# Patient Record
Sex: MALE | Race: WHITE | Employment: OTHER | URBAN - METROPOLITAN AREA
[De-identification: names, ages, dates, MRNs, and addresses within clinical notes are randomized per-mention and may not be internally consistent; named-entity substitution may affect disease eponyms.]

---

## 2023-09-03 PROBLEM — I10 ESSENTIAL HYPERTENSION: Status: ACTIVE | Noted: 2023-09-03

## 2023-09-03 PROBLEM — R30.0 DYSURIA: Status: ACTIVE | Noted: 2023-09-03

## 2023-09-03 PROBLEM — K21.9 GASTROESOPHAGEAL REFLUX DISEASE: Status: ACTIVE | Noted: 2023-09-03

## 2023-09-03 PROBLEM — J44.9 CHRONIC OBSTRUCTIVE PULMONARY DISEASE (MULTI): Status: ACTIVE | Noted: 2023-09-03

## 2023-09-03 PROBLEM — N34.2 URETHRITIS: Status: ACTIVE | Noted: 2023-09-03

## 2023-09-03 PROBLEM — Z91.89 AT RISK FOR BLEEDING: Status: ACTIVE | Noted: 2023-09-03

## 2023-09-03 PROBLEM — E03.9 HYPOTHYROIDISM: Status: ACTIVE | Noted: 2023-09-03

## 2023-09-03 PROBLEM — E55.9 VITAMIN D DEFICIENCY: Status: ACTIVE | Noted: 2023-09-03

## 2023-09-03 PROBLEM — R55 SYNCOPE: Status: ACTIVE | Noted: 2023-09-03

## 2023-09-03 PROBLEM — R51.9 HEADACHE: Status: ACTIVE | Noted: 2023-09-03

## 2023-09-03 PROBLEM — K57.32 DIVERTICULITIS OF COLON: Status: ACTIVE | Noted: 2023-09-03

## 2023-09-03 PROBLEM — Z98.2 VENTRICULAR SHUNT IN PLACE: Status: ACTIVE | Noted: 2023-09-03

## 2023-09-03 PROBLEM — J43.9 PULMONARY EMPHYSEMA (MULTI): Status: ACTIVE | Noted: 2023-09-03

## 2023-09-03 PROBLEM — R41.82 ALTERED MENTAL STATUS: Status: ACTIVE | Noted: 2023-09-03

## 2023-09-03 PROBLEM — E78.00 PURE HYPERCHOLESTEROLEMIA: Status: ACTIVE | Noted: 2023-09-03

## 2023-09-03 PROBLEM — R06.00 DYSPNEA: Status: ACTIVE | Noted: 2023-09-03

## 2023-09-03 PROBLEM — H25.12 AGE-RELATED NUCLEAR CATARACT OF LEFT EYE: Status: ACTIVE | Noted: 2023-09-03

## 2023-09-03 RX ORDER — FLUTICASONE PROPIONATE AND SALMETEROL 250; 50 UG/1; UG/1
1 POWDER RESPIRATORY (INHALATION)
COMMUNITY
End: 2023-10-17

## 2023-09-03 RX ORDER — CIPROFLOXACIN 500 MG/1
500 TABLET ORAL 2 TIMES DAILY
COMMUNITY
Start: 2019-08-28 | End: 2023-09-08 | Stop reason: ALTCHOICE

## 2023-09-03 RX ORDER — ALBUTEROL SULFATE 0.83 MG/ML
SOLUTION RESPIRATORY (INHALATION)
COMMUNITY
Start: 2021-10-12 | End: 2024-01-10 | Stop reason: ALTCHOICE

## 2023-09-03 RX ORDER — LISINOPRIL AND HYDROCHLOROTHIAZIDE 12.5; 2 MG/1; MG/1
1 TABLET ORAL DAILY
COMMUNITY
Start: 2023-01-16 | End: 2023-12-20

## 2023-09-03 RX ORDER — CHOLECALCIFEROL (VITAMIN D3) 125 MCG
125 CAPSULE ORAL DAILY
COMMUNITY
Start: 2023-02-26 | End: 2023-09-08 | Stop reason: DRUGHIGH

## 2023-09-03 RX ORDER — ERGOCALCIFEROL 1.25 MG/1
50000 CAPSULE ORAL
COMMUNITY
Start: 2023-02-26 | End: 2024-01-10 | Stop reason: ALTCHOICE

## 2023-09-03 RX ORDER — ALBUTEROL SULFATE 90 UG/1
2 AEROSOL, METERED RESPIRATORY (INHALATION)
COMMUNITY
Start: 2019-12-02 | End: 2024-04-10 | Stop reason: SDUPTHER

## 2023-09-03 RX ORDER — ACETAMINOPHEN 500 MG
5 TABLET ORAL EVERY EVENING
COMMUNITY

## 2023-09-03 RX ORDER — ONDANSETRON 4 MG/1
1 TABLET, FILM COATED ORAL EVERY 6 HOURS PRN
COMMUNITY
End: 2023-09-08 | Stop reason: ALTCHOICE

## 2023-10-14 DIAGNOSIS — J43.9 PULMONARY EMPHYSEMA, UNSPECIFIED EMPHYSEMA TYPE (MULTI): Primary | ICD-10-CM

## 2023-10-17 DIAGNOSIS — J43.9 PULMONARY EMPHYSEMA, UNSPECIFIED EMPHYSEMA TYPE (MULTI): ICD-10-CM

## 2023-10-17 RX ORDER — FLUTICASONE PROPIONATE AND SALMETEROL 250; 50 UG/1; UG/1
1 POWDER RESPIRATORY (INHALATION) 2 TIMES DAILY
Qty: 60 EACH | Refills: 5 | Status: SHIPPED | OUTPATIENT
Start: 2023-10-17 | End: 2023-10-18 | Stop reason: SDUPTHER

## 2023-10-17 RX ORDER — FLUTICASONE PROPIONATE AND SALMETEROL 250; 50 UG/1; UG/1
1 POWDER RESPIRATORY (INHALATION) 2 TIMES DAILY
Qty: 90 EACH | Refills: 0 | OUTPATIENT
Start: 2023-10-17 | End: 2024-01-15

## 2023-10-18 RX ORDER — FLUTICASONE PROPIONATE AND SALMETEROL 250; 50 UG/1; UG/1
1 POWDER RESPIRATORY (INHALATION) 2 TIMES DAILY
Qty: 3 EACH | Refills: 1 | Status: SHIPPED | OUTPATIENT
Start: 2023-10-18

## 2023-12-18 ENCOUNTER — APPOINTMENT (OUTPATIENT)
Dept: PRIMARY CARE | Facility: CLINIC | Age: 58
End: 2023-12-18
Payer: COMMERCIAL

## 2023-12-18 DIAGNOSIS — I10 PRIMARY HYPERTENSION: Primary | ICD-10-CM

## 2023-12-18 NOTE — PROGRESS NOTES
-additionally diagnosed as MCTD  -Recently seen by rheumatology 6/2023  -Advised starting leflunomide   -Hx of Humira, methotrexate in the past, defers, defers steroid as well  -Taking Meloxicam daily--dicussed side effects  -Continue following physical therapy   [facility]: [facility]  PROGRESS NOTE      Jun Dobson is a 58 y.o. male that is presenting today for No chief complaint on file..    Assessment/Plan   {Assess/Plan SmartLinks (Optional):63150}  Subjective   - Patient is here today for FUV    - Patient denies any XX symptoms or concerns at this time.    - patient denies any XX  adverse reactions to or concerns with his/her meds.      Review of Systems   All pertinent POSITIVES as noted per HPI.  All other systems have been reviewed and are NEGATIVE and /or Noncontributory to this patient current visit or complaint.  Objective   There were no vitals filed for this visit.   There is no height or weight on file to calculate BMI.  Physical Exam  Vitals and nursing note reviewed.   Constitutional:       Appearance: Normal appearance.   HENT:      Head: Normocephalic and atraumatic.   Neck:      Vascular: No carotid bruit.   Cardiovascular:      Rate and Rhythm: Normal rate and regular rhythm.      Pulses: Normal pulses.      Heart sounds: Normal heart sounds.   Pulmonary:      Effort: Pulmonary effort is normal.      Breath sounds: Normal breath sounds.   Abdominal:      General: Abdomen is flat. Bowel sounds are normal.      Palpations: Abdomen is soft.   Musculoskeletal:         General: No swelling. Normal range of motion.      Cervical back: Neck supple.   Lymphadenopathy:      Cervical: No cervical adenopathy.   Skin:     General: Skin is warm and dry.   Neurological:      Mental Status: He is alert.   Psychiatric:         Mood and Affect: Mood normal.     Diagnostic Results   Lab Results   Component Value Date    GLUCOSE 84 02/15/2023    CALCIUM 9.7 02/15/2023     02/15/2023    K 4.7 02/15/2023    CO2 24 02/15/2023     02/15/2023    BUN 18 02/15/2023    CREATININE 1.3 02/15/2023     Lab Results   Component Value Date    ALT 12 02/15/2023    AST 17 02/15/2023    ALKPHOS 51 02/15/2023    BILITOT 0.6 02/15/2023     Lab Results  "  Component Value Date    WBC 8.6 02/15/2023    HGB 14.5 02/15/2023    HCT 41.3 02/15/2023    MCV 91.4 02/15/2023     02/15/2023     Lab Results   Component Value Date    CHOL 141 02/15/2023    CHOL 150 08/13/2021    CHOL 132 (L) 09/01/2018     Lab Results   Component Value Date    HDL 52 02/15/2023    HDL 48 08/13/2021    HDL 37 (L) 09/01/2018     Lab Results   Component Value Date    LDLCALC 80 02/15/2023    LDLCALC 86 08/13/2021    LDLCALC 85 09/01/2018     Lab Results   Component Value Date    TRIG 46 02/15/2023    TRIG 81 08/13/2021    TRIG 49 09/01/2018     No components found for: \"CHOLHDL\"  Lab Results   Component Value Date    HGBA1C 4.7 02/15/2023     Other labs not included in the list above were reviewed either before or during this encounter.    History    Past Medical History:   Diagnosis Date    Personal history of other diseases of the digestive system     History of diverticulitis of colon     Past Surgical History:   Procedure Laterality Date    EYE SURGERY  05/22/2015    Eye Surgery    NECK SURGERY  05/22/2015    Neck Surgery    NOSE SURGERY  05/22/2015    Nose Surgery    OTHER SURGICAL HISTORY  05/22/2015    Brain Surgery     Family History   Problem Relation Name Age of Onset    Heart disease Father      Heart failure Father      Asthma Daughter      Other (Heart Surgery) Daughter       Social History     Socioeconomic History    Marital status:      Spouse name: Not on file    Number of children: Not on file    Years of education: Not on file    Highest education level: Not on file   Occupational History    Not on file   Tobacco Use    Smoking status: Not on file    Smokeless tobacco: Not on file   Substance and Sexual Activity    Alcohol use: Not on file    Drug use: Not on file    Sexual activity: Not on file   Other Topics Concern    Not on file   Social History Narrative    Not on file     Social Determinants of Health     Financial Resource Strain: Not on file   Food " Insecurity: Not on file   Transportation Needs: Not on file   Physical Activity: Not on file   Stress: Not on file   Social Connections: Not on file   Intimate Partner Violence: Not on file   Housing Stability: Not on file     Allergies   Allergen Reactions    Azithromycin Other and Unknown     gi symptoms    Codeine Other and Unknown     gi symptoms    Erythromycin Unknown    Penicillins Hives and Unknown     Breathing issues     Current Outpatient Medications on File Prior to Visit   Medication Sig Dispense Refill    albuterol 2.5 mg /3 mL (0.083 %) nebulizer solution as directed inhalation every 4-6 hrs prn.      albuterol 90 mcg/actuation inhaler Inhale 2 puffs 4 times a day.      ergocalciferol (Vitamin D-2) 1.25 MG (50204 UT) capsule Take 1 capsule (50,000 Units) by mouth. three times a week for 90 days      fluticasone propion-salmeteroL (Wixela Inhub) 250-50 mcg/dose diskus inhaler Inhale 1 puff 2 times a day. 3 each 1    lisinopriL-hydrochlorothiazide 20-12.5 mg tablet Take 1 tablet by mouth once daily.      melatonin 5 mg tablet Take 1 tablet (5 mg) by mouth once daily in the evening.       No current facility-administered medications on file prior to visit.     Immunization History   Administered Date(s) Administered    Pfizer Purple Cap SARS-CoV-2 04/21/2021, 05/12/2021, 12/09/2021    Pneumococcal polysaccharide vaccine, 23-valent, age 2 years and older (PNEUMOVAX 23) 05/22/2015    Tdap vaccine, age 7 year and older (BOOSTRIX) 08/13/2021     Patient's medical history was reviewed and updated either before or during this encounter.       Anand Davila MD

## 2023-12-20 RX ORDER — LISINOPRIL AND HYDROCHLOROTHIAZIDE 12.5; 2 MG/1; MG/1
1 TABLET ORAL DAILY
Qty: 90 TABLET | Refills: 0 | Status: SHIPPED | OUTPATIENT
Start: 2023-12-20 | End: 2024-03-05

## 2024-01-10 ENCOUNTER — OFFICE VISIT (OUTPATIENT)
Dept: PRIMARY CARE | Facility: CLINIC | Age: 59
End: 2024-01-10
Payer: COMMERCIAL

## 2024-01-10 VITALS
OXYGEN SATURATION: 95 % | DIASTOLIC BLOOD PRESSURE: 82 MMHG | WEIGHT: 140 LBS | HEART RATE: 63 BPM | BODY MASS INDEX: 21.22 KG/M2 | HEIGHT: 68 IN | SYSTOLIC BLOOD PRESSURE: 134 MMHG | TEMPERATURE: 97.6 F

## 2024-01-10 DIAGNOSIS — Z86.2 HISTORY OF ANEMIA: ICD-10-CM

## 2024-01-10 DIAGNOSIS — E55.9 VITAMIN D DEFICIENCY: ICD-10-CM

## 2024-01-10 DIAGNOSIS — E78.00 PURE HYPERCHOLESTEROLEMIA: ICD-10-CM

## 2024-01-10 DIAGNOSIS — Z01.89 ENCOUNTER FOR ROUTINE LABORATORY TESTING: ICD-10-CM

## 2024-01-10 DIAGNOSIS — J44.9 CHRONIC OBSTRUCTIVE PULMONARY DISEASE, UNSPECIFIED COPD TYPE (MULTI): ICD-10-CM

## 2024-01-10 DIAGNOSIS — Z12.5 ENCOUNTER FOR PROSTATE CANCER SCREENING: ICD-10-CM

## 2024-01-10 DIAGNOSIS — I10 ESSENTIAL HYPERTENSION: Primary | ICD-10-CM

## 2024-01-10 PROCEDURE — 1036F TOBACCO NON-USER: CPT | Performed by: INTERNAL MEDICINE

## 2024-01-10 PROCEDURE — 99213 OFFICE O/P EST LOW 20 MIN: CPT | Performed by: INTERNAL MEDICINE

## 2024-01-10 PROCEDURE — 3075F SYST BP GE 130 - 139MM HG: CPT | Performed by: INTERNAL MEDICINE

## 2024-01-10 PROCEDURE — 3079F DIAST BP 80-89 MM HG: CPT | Performed by: INTERNAL MEDICINE

## 2024-01-10 RX ORDER — UMECLIDINIUM 62.5 UG/1
AEROSOL, POWDER ORAL
COMMUNITY
Start: 2024-01-08 | End: 2024-01-10

## 2024-01-10 RX ORDER — ACETAMINOPHEN 500 MG
TABLET ORAL DAILY
COMMUNITY
End: 2024-04-10 | Stop reason: ALTCHOICE

## 2024-01-10 ASSESSMENT — PATIENT HEALTH QUESTIONNAIRE - PHQ9
1. LITTLE INTEREST OR PLEASURE IN DOING THINGS: NOT AT ALL
2. FEELING DOWN, DEPRESSED OR HOPELESS: NOT AT ALL
SUM OF ALL RESPONSES TO PHQ9 QUESTIONS 1 AND 2: 0

## 2024-01-10 ASSESSMENT — ENCOUNTER SYMPTOMS
LOSS OF SENSATION IN FEET: 0
OCCASIONAL FEELINGS OF UNSTEADINESS: 0
DEPRESSION: 0

## 2024-01-10 ASSESSMENT — PAIN SCALES - GENERAL: PAINLEVEL: 0-NO PAIN

## 2024-03-05 DIAGNOSIS — I10 PRIMARY HYPERTENSION: ICD-10-CM

## 2024-03-05 RX ORDER — LISINOPRIL AND HYDROCHLOROTHIAZIDE 12.5; 2 MG/1; MG/1
1 TABLET ORAL DAILY
Qty: 90 TABLET | Refills: 0 | Status: SHIPPED | OUTPATIENT
Start: 2024-03-05 | End: 2024-04-11 | Stop reason: ALTCHOICE

## 2024-04-03 ENCOUNTER — LAB (OUTPATIENT)
Dept: LAB | Facility: LAB | Age: 59
End: 2024-04-03
Payer: COMMERCIAL

## 2024-04-03 DIAGNOSIS — I10 ESSENTIAL HYPERTENSION: ICD-10-CM

## 2024-04-03 DIAGNOSIS — E78.00 PURE HYPERCHOLESTEROLEMIA: ICD-10-CM

## 2024-04-03 DIAGNOSIS — Z12.5 ENCOUNTER FOR PROSTATE CANCER SCREENING: ICD-10-CM

## 2024-04-03 DIAGNOSIS — Z86.2 HISTORY OF ANEMIA: ICD-10-CM

## 2024-04-03 DIAGNOSIS — Z01.89 ENCOUNTER FOR ROUTINE LABORATORY TESTING: ICD-10-CM

## 2024-04-03 DIAGNOSIS — E55.9 VITAMIN D DEFICIENCY: ICD-10-CM

## 2024-04-03 LAB
25(OH)D3 SERPL-MCNC: 130 NG/ML (ref 31–100)
ALBUMIN SERPL-MCNC: 4.7 G/DL (ref 3.5–5)
ALP BLD-CCNC: 51 U/L (ref 35–125)
ALT SERPL-CCNC: 16 U/L (ref 5–40)
ANION GAP SERPL CALC-SCNC: 13 MMOL/L
AST SERPL-CCNC: 16 U/L (ref 5–40)
BASOPHILS # BLD AUTO: 0.07 X10*3/UL (ref 0–0.1)
BASOPHILS NFR BLD AUTO: 0.7 %
BILIRUB SERPL-MCNC: 0.4 MG/DL (ref 0.1–1.2)
BUN SERPL-MCNC: 15 MG/DL (ref 8–25)
CALCIUM SERPL-MCNC: 9.9 MG/DL (ref 8.5–10.4)
CHLORIDE SERPL-SCNC: 102 MMOL/L (ref 97–107)
CHOLEST SERPL-MCNC: 183 MG/DL (ref 133–200)
CHOLEST/HDLC SERPL: 3 {RATIO}
CO2 SERPL-SCNC: 25 MMOL/L (ref 24–31)
CREAT SERPL-MCNC: 1.4 MG/DL (ref 0.4–1.6)
EGFRCR SERPLBLD CKD-EPI 2021: 57 ML/MIN/1.73M*2
EOSINOPHIL # BLD AUTO: 0.2 X10*3/UL (ref 0–0.7)
EOSINOPHIL NFR BLD AUTO: 2 %
ERYTHROCYTE [DISTWIDTH] IN BLOOD BY AUTOMATED COUNT: 13.1 % (ref 11.5–14.5)
EST. AVERAGE GLUCOSE BLD GHB EST-MCNC: 94 MG/DL
GLUCOSE SERPL-MCNC: 88 MG/DL (ref 65–99)
HBA1C MFR BLD: 4.9 %
HCT VFR BLD AUTO: 42.1 % (ref 41–52)
HDLC SERPL-MCNC: 61 MG/DL
HGB BLD-MCNC: 14.1 G/DL (ref 13.5–17.5)
IMM GRANULOCYTES # BLD AUTO: 0.01 X10*3/UL (ref 0–0.7)
IMM GRANULOCYTES NFR BLD AUTO: 0.1 % (ref 0–0.9)
LDLC SERPL CALC-MCNC: 110 MG/DL (ref 65–130)
LYMPHOCYTES # BLD AUTO: 2.04 X10*3/UL (ref 1.2–4.8)
LYMPHOCYTES NFR BLD AUTO: 20.7 %
MCH RBC QN AUTO: 31.8 PG (ref 26–34)
MCHC RBC AUTO-ENTMCNC: 33.5 G/DL (ref 32–36)
MCV RBC AUTO: 95 FL (ref 80–100)
MONOCYTES # BLD AUTO: 0.88 X10*3/UL (ref 0.1–1)
MONOCYTES NFR BLD AUTO: 8.9 %
NEUTROPHILS # BLD AUTO: 6.64 X10*3/UL (ref 1.2–7.7)
NEUTROPHILS NFR BLD AUTO: 67.6 %
NRBC BLD-RTO: 0 /100 WBCS (ref 0–0)
PLATELET # BLD AUTO: 306 X10*3/UL (ref 150–450)
POTASSIUM SERPL-SCNC: 4.5 MMOL/L (ref 3.4–5.1)
PROT SERPL-MCNC: 6.9 G/DL (ref 5.9–7.9)
PSA SERPL-MCNC: 0.9 NG/ML
RBC # BLD AUTO: 4.43 X10*6/UL (ref 4.5–5.9)
SODIUM SERPL-SCNC: 140 MMOL/L (ref 133–145)
TRIGL SERPL-MCNC: 58 MG/DL (ref 40–150)
TSH SERPL DL<=0.05 MIU/L-ACNC: 0.56 MIU/L (ref 0.27–4.2)
WBC # BLD AUTO: 9.8 X10*3/UL (ref 4.4–11.3)

## 2024-04-03 PROCEDURE — 84153 ASSAY OF PSA TOTAL: CPT

## 2024-04-03 PROCEDURE — 83036 HEMOGLOBIN GLYCOSYLATED A1C: CPT

## 2024-04-03 PROCEDURE — 36415 COLL VENOUS BLD VENIPUNCTURE: CPT

## 2024-04-03 PROCEDURE — 80053 COMPREHEN METABOLIC PANEL: CPT

## 2024-04-03 PROCEDURE — 84443 ASSAY THYROID STIM HORMONE: CPT

## 2024-04-03 PROCEDURE — 82306 VITAMIN D 25 HYDROXY: CPT

## 2024-04-03 PROCEDURE — 80061 LIPID PANEL: CPT

## 2024-04-03 PROCEDURE — 85025 COMPLETE CBC W/AUTO DIFF WBC: CPT

## 2024-04-05 PROBLEM — R05.9 COUGH, UNSPECIFIED: Status: ACTIVE | Noted: 2024-04-05

## 2024-04-05 PROBLEM — R06.02 SHORTNESS OF BREATH: Status: ACTIVE | Noted: 2023-09-03

## 2024-04-05 PROBLEM — H25.10 NUCLEAR SENILE CATARACT: Status: ACTIVE | Noted: 2023-09-03

## 2024-04-05 PROBLEM — Z86.2 HISTORY OF ANEMIA: Status: ACTIVE | Noted: 2024-04-03

## 2024-04-05 PROBLEM — R10.30 LOWER ABDOMINAL PAIN: Status: ACTIVE | Noted: 2024-04-05

## 2024-04-05 RX ORDER — UMECLIDINIUM 62.5 UG/1
AEROSOL, POWDER ORAL
COMMUNITY
Start: 2024-02-04

## 2024-04-05 RX ORDER — AMLODIPINE BESYLATE 5 MG/1
TABLET ORAL EVERY 24 HOURS
COMMUNITY
Start: 2022-07-12 | End: 2024-04-10

## 2024-04-10 ENCOUNTER — OFFICE VISIT (OUTPATIENT)
Dept: PRIMARY CARE | Facility: CLINIC | Age: 59
End: 2024-04-10
Payer: COMMERCIAL

## 2024-04-10 VITALS
TEMPERATURE: 97.8 F | HEIGHT: 68 IN | SYSTOLIC BLOOD PRESSURE: 122 MMHG | WEIGHT: 141 LBS | HEART RATE: 56 BPM | BODY MASS INDEX: 21.37 KG/M2 | DIASTOLIC BLOOD PRESSURE: 75 MMHG | OXYGEN SATURATION: 96 %

## 2024-04-10 DIAGNOSIS — R25.2 CRAMPING OF HANDS: ICD-10-CM

## 2024-04-10 DIAGNOSIS — N18.31 STAGE 3A CHRONIC KIDNEY DISEASE (MULTI): ICD-10-CM

## 2024-04-10 DIAGNOSIS — I10 PRIMARY HYPERTENSION: Primary | ICD-10-CM

## 2024-04-10 DIAGNOSIS — J43.2 CENTRILOBULAR EMPHYSEMA (MULTI): ICD-10-CM

## 2024-04-10 DIAGNOSIS — E55.9 VITAMIN D DEFICIENCY: ICD-10-CM

## 2024-04-10 PROBLEM — N18.30 STAGE 3 CHRONIC KIDNEY DISEASE (MULTI): Status: ACTIVE | Noted: 2024-04-10

## 2024-04-10 PROCEDURE — 3074F SYST BP LT 130 MM HG: CPT | Performed by: INTERNAL MEDICINE

## 2024-04-10 PROCEDURE — 99214 OFFICE O/P EST MOD 30 MIN: CPT | Performed by: INTERNAL MEDICINE

## 2024-04-10 PROCEDURE — 3078F DIAST BP <80 MM HG: CPT | Performed by: INTERNAL MEDICINE

## 2024-04-10 RX ORDER — ALBUTEROL SULFATE 90 UG/1
2 AEROSOL, METERED RESPIRATORY (INHALATION)
Qty: 18 G | Refills: 3 | Status: SHIPPED | OUTPATIENT
Start: 2024-04-10

## 2024-04-10 RX ORDER — LISINOPRIL 10 MG/1
10 TABLET ORAL DAILY
Qty: 90 TABLET | Refills: 0 | Status: SHIPPED | OUTPATIENT
Start: 2024-04-10

## 2024-04-10 ASSESSMENT — PATIENT HEALTH QUESTIONNAIRE - PHQ9
2. FEELING DOWN, DEPRESSED OR HOPELESS: NOT AT ALL
SUM OF ALL RESPONSES TO PHQ9 QUESTIONS 1 AND 2: 0
1. LITTLE INTEREST OR PLEASURE IN DOING THINGS: NOT AT ALL

## 2024-04-10 ASSESSMENT — PAIN SCALES - GENERAL: PAINLEVEL: 0-NO PAIN

## 2024-04-10 NOTE — PROGRESS NOTES
"[facility]: [facility]  PROGRESS NOTE      Jun Dobson is a 59 y.o. male that is presenting today for Follow-up (3 mo fu FBW, fingers \"freeze\" up several times a week, unable to them).    Assessment/Plan   Diagnoses and all orders for this visit:  Centrilobular emphysema (CMS/HCC)     Under control with current treatment   Continue the same   Rx E-scripted 1 days x 1  -     albuterol 90 mcg/actuation inhaler; Inhale 2 puffs 4 times a day.  Primary hypertension  Under control with current treatment   Continue the same   Rx E-scripted 90 days x 1  -     lisinopril 10 mg tablet; Take 1 tablet (10 mg) by mouth once daily.  -     Renal function panel; Future  Stage 3a chronic kidney disease (CMS/HCC)      - Stable / Continue to monitor    - Following with Nephrology   - Stressed importance of hydration and avoiding Nephrotoxic meds mostly OTC NSAIDs  -     Renal function panel; Future  Cramping of hands       Start Co Q10 200 mg daily  Vitamin D deficiency  -     cholecalciferol (Vitamin D3) 50 mcg (2,000 unit) capsule; Take 1 capsule (50 mcg) by mouth once daily.  Other orders  -     Follow Up In Primary Care  -     Follow Up In Primary Care; Future  Subjective   - Patient is here today for 6 months FUV hand cramps in the evening     - Patient denies any other symptoms or concerns , at this time.    - patient denies any adverse reactions to or concerns with his/her meds.      Review of Systems   All pertinent POSITIVES as noted per HPI.  All other systems have been reviewed and are NEGATIVE and /or Noncontributory to this patient current visit or complaint.    Objective   Vitals:    04/10/24 1409   BP: 122/75   Pulse: 56   Temp: 36.6 °C (97.8 °F)   SpO2: 96%      Body mass index is 21.44 kg/m².  Physical Exam  Vitals and nursing note reviewed.   Constitutional:       Appearance: Normal appearance.   HENT:      Head: Normocephalic and atraumatic.   Neck:      Vascular: No carotid bruit. " "  Cardiovascular:      Rate and Rhythm: Normal rate and regular rhythm.      Pulses: Normal pulses.      Heart sounds: Normal heart sounds.   Pulmonary:      Effort: Pulmonary effort is normal.      Breath sounds: Normal breath sounds.   Abdominal:      General: Abdomen is flat. Bowel sounds are normal.      Palpations: Abdomen is soft.   Musculoskeletal:         General: No swelling. Normal range of motion.      Cervical back: Neck supple.   Lymphadenopathy:      Cervical: No cervical adenopathy.   Skin:     General: Skin is warm and dry.   Neurological:      Mental Status: He is alert.   Psychiatric:         Mood and Affect: Mood normal.       Diagnostic Results   Lab Results   Component Value Date    GLUCOSE 88 04/03/2024    CALCIUM 9.9 04/03/2024     04/03/2024    K 4.5 04/03/2024    CO2 25 04/03/2024     04/03/2024    BUN 15 04/03/2024    CREATININE 1.40 04/03/2024     Lab Results   Component Value Date    ALT 16 04/03/2024    AST 16 04/03/2024    ALKPHOS 51 04/03/2024    BILITOT 0.4 04/03/2024     Lab Results   Component Value Date    WBC 9.8 04/03/2024    HGB 14.1 04/03/2024    HCT 42.1 04/03/2024    MCV 95 04/03/2024     04/03/2024     Lab Results   Component Value Date    CHOL 183 04/03/2024    CHOL 141 02/15/2023    CHOL 150 08/13/2021     Lab Results   Component Value Date    HDL 61.0 04/03/2024    HDL 52 02/15/2023    HDL 48 08/13/2021     Lab Results   Component Value Date    LDLCALC 110 04/03/2024    LDLCALC 80 02/15/2023    LDLCALC 86 08/13/2021     Lab Results   Component Value Date    TRIG 58 04/03/2024    TRIG 46 02/15/2023    TRIG 81 08/13/2021     No components found for: \"CHOLHDL\"  Lab Results   Component Value Date    HGBA1C 4.9 04/03/2024     Other labs not included in the list above were reviewed either before or during this encounter.    History    Past Medical History:   Diagnosis Date    Personal history of other diseases of the digestive system     History of " diverticulitis of colon     Past Surgical History:   Procedure Laterality Date    EYE SURGERY  2015    Eye Surgery    NECK SURGERY  2015    Neck Surgery    NOSE SURGERY  2015    Nose Surgery    OTHER SURGICAL HISTORY  2015    Brain Surgery     Family History   Problem Relation Name Age of Onset    Heart disease Father      Heart failure Father      Asthma Daughter      Other (Heart Surgery) Daughter       Social History     Socioeconomic History    Marital status:      Spouse name: Not on file    Number of children: Not on file    Years of education: Not on file    Highest education level: Not on file   Occupational History    Not on file   Tobacco Use    Smoking status: Former     Current packs/day: 0.00     Types: Cigarettes     Quit date:      Years since quittin.2     Passive exposure: Past    Smokeless tobacco: Never   Vaping Use    Vaping status: Never Used   Substance and Sexual Activity    Alcohol use: Yes    Drug use: Never    Sexual activity: Not on file   Other Topics Concern    Not on file   Social History Narrative    Not on file     Social Determinants of Health     Financial Resource Strain: Not on file   Food Insecurity: Not on file   Transportation Needs: Not on file   Physical Activity: Not on file   Stress: Not on file   Social Connections: Not on file   Intimate Partner Violence: Not on file   Housing Stability: Not on file     Allergies   Allergen Reactions    Azithromycin Other and Unknown     gi symptoms    Codeine Other and Unknown     gi symptoms    Erythromycin Unknown    Penicillins Hives and Unknown     Breathing issues     Current Outpatient Medications on File Prior to Visit   Medication Sig Dispense Refill    albuterol 90 mcg/actuation inhaler Inhale 2 puffs 4 times a day.      amLODIPine (Norvasc) 5 mg tablet Take by mouth once every 24 hours.      cholecalciferol (Vitamin D3) 50 mcg (2,000 unit) capsule Take by mouth once daily.       fluticasone propion-salmeteroL (Wixela Inhub) 250-50 mcg/dose diskus inhaler Inhale 1 puff 2 times a day. 3 each 1    Incruse Ellipta 62.5 mcg/actuation inhalation       lisinopriL-hydrochlorothiazide 20-12.5 mg tablet TAKE 1 TABLET BY MOUTH EVERY DAY 90 tablet 0    melatonin 5 mg tablet Take 1 tablet (5 mg) by mouth once daily in the evening.       No current facility-administered medications on file prior to visit.     Immunization History   Administered Date(s) Administered    Pfizer Purple Cap SARS-CoV-2 04/21/2021, 05/12/2021, 12/09/2021    Pneumococcal polysaccharide vaccine, 23-valent, age 2 years and older (PNEUMOVAX 23) 05/22/2015    Tdap vaccine, age 7 year and older (BOOSTRIX, ADACEL) 08/13/2021     Patient's medical history was reviewed and updated either before or during this encounter.       Anand Davila MD

## 2024-04-11 PROBLEM — R25.2 CRAMPING OF HANDS: Status: ACTIVE | Noted: 2024-04-11

## 2024-04-11 RX ORDER — ACETAMINOPHEN 160 MG/5ML
200 SUSPENSION, ORAL (FINAL DOSE FORM) ORAL DAILY
Qty: 30 CAPSULE | Refills: 11 | Status: SHIPPED | OUTPATIENT
Start: 2024-04-11 | End: 2025-04-11

## 2024-04-11 RX ORDER — ACETAMINOPHEN 500 MG
2000 TABLET ORAL DAILY
Start: 2024-04-11

## 2024-06-17 ENCOUNTER — LAB (OUTPATIENT)
Dept: LAB | Facility: LAB | Age: 59
End: 2024-06-17
Payer: COMMERCIAL

## 2024-06-17 DIAGNOSIS — N18.31 STAGE 3A CHRONIC KIDNEY DISEASE (MULTI): ICD-10-CM

## 2024-06-17 DIAGNOSIS — I10 PRIMARY HYPERTENSION: ICD-10-CM

## 2024-06-17 LAB
ALBUMIN SERPL-MCNC: 4.3 G/DL (ref 3.5–5)
ANION GAP SERPL CALC-SCNC: 9 MMOL/L
BUN SERPL-MCNC: 7 MG/DL (ref 8–25)
CALCIUM SERPL-MCNC: 9.6 MG/DL (ref 8.5–10.4)
CHLORIDE SERPL-SCNC: 107 MMOL/L (ref 97–107)
CO2 SERPL-SCNC: 24 MMOL/L (ref 24–31)
CREAT SERPL-MCNC: 1.2 MG/DL (ref 0.4–1.6)
EGFRCR SERPLBLD CKD-EPI 2021: 69 ML/MIN/1.73M*2
GLUCOSE SERPL-MCNC: 94 MG/DL (ref 65–99)
PHOSPHATE SERPL-MCNC: 2.5 MG/DL (ref 2.5–4.5)
POTASSIUM SERPL-SCNC: 4.4 MMOL/L (ref 3.4–5.1)
SODIUM SERPL-SCNC: 140 MMOL/L (ref 133–145)

## 2024-06-17 PROCEDURE — 80069 RENAL FUNCTION PANEL: CPT

## 2024-06-17 PROCEDURE — 36415 COLL VENOUS BLD VENIPUNCTURE: CPT

## 2024-06-20 ENCOUNTER — OFFICE VISIT (OUTPATIENT)
Dept: PRIMARY CARE | Facility: CLINIC | Age: 59
End: 2024-06-20
Payer: COMMERCIAL

## 2024-06-20 VITALS
OXYGEN SATURATION: 95 % | WEIGHT: 138 LBS | HEART RATE: 73 BPM | SYSTOLIC BLOOD PRESSURE: 162 MMHG | TEMPERATURE: 97 F | HEIGHT: 68 IN | DIASTOLIC BLOOD PRESSURE: 84 MMHG | BODY MASS INDEX: 20.92 KG/M2

## 2024-06-20 DIAGNOSIS — I10 UNCONTROLLED HYPERTENSION: Primary | ICD-10-CM

## 2024-06-20 PROCEDURE — 3079F DIAST BP 80-89 MM HG: CPT | Performed by: INTERNAL MEDICINE

## 2024-06-20 PROCEDURE — 99213 OFFICE O/P EST LOW 20 MIN: CPT | Performed by: INTERNAL MEDICINE

## 2024-06-20 PROCEDURE — 3077F SYST BP >= 140 MM HG: CPT | Performed by: INTERNAL MEDICINE

## 2024-06-20 RX ORDER — TIOTROPIUM BROMIDE 18 UG/1
1 CAPSULE ORAL; RESPIRATORY (INHALATION)
COMMUNITY
Start: 2024-04-29

## 2024-06-20 RX ORDER — LISINOPRIL AND HYDROCHLOROTHIAZIDE 10; 12.5 MG/1; MG/1
1 TABLET ORAL DAILY
Qty: 30 TABLET | Refills: 2 | Status: SHIPPED | OUTPATIENT
Start: 2024-06-20

## 2024-06-20 ASSESSMENT — PAIN SCALES - GENERAL: PAINLEVEL: 0-NO PAIN

## 2024-06-20 NOTE — PROGRESS NOTES
[facility]: [facility]  PROGRESS NOTE      Jun Dobson is a 59 y.o. male that is presenting today for Follow-up.    Assessment/Plan   Diagnoses and all orders for this visit:  Uncontrolled hypertension     Uncontrolled with current treatment   Continue Lisonipril 10 mg and add the HCT  Rx E-scripted 100 days x 1  -  Start   lisinopriL-hydrochlorothiazide 10-12.5 mg tablet; Take 1 tablet by mouth once daily.  -     Renal function panel; Future  Other orders  -     Follow Up In Primary Care  -     Follow Up In Primary Care; Future  Subjective     - Jun Dobson 59 y.o. male is here today for FUV on his elevated BP - been watching his salt        - Patient denies any symptoms or concerns at this time.       - patient denies any adverse reactions to or concerns with his/her meds.       - Problem list and medication reconciliation done today.  - V.S. Stable. No changes at this time.  - Encouraged continued diet and exercise modification.    Review of Systems   All pertinent POSITIVES as noted per HPI.  All other systems have been reviewed and are NEGATIVE and /or Noncontributory to this patient current visit or complaint.    Objective   Vitals:    06/20/24 1330   BP: 162/84   Pulse: 73   Temp: 36.1 °C (97 °F)   SpO2: 95%      Body mass index is 20.98 kg/m².  Physical Exam  Vitals and nursing note reviewed.   Constitutional:       Appearance: Normal appearance.   HENT:      Head: Normocephalic and atraumatic.   Neck:      Vascular: No carotid bruit.   Cardiovascular:      Rate and Rhythm: Normal rate and regular rhythm.      Pulses: Normal pulses.      Heart sounds: Normal heart sounds.   Pulmonary:      Effort: Pulmonary effort is normal.      Breath sounds: Normal breath sounds.   Abdominal:      General: Abdomen is flat. Bowel sounds are normal.      Palpations: Abdomen is soft.   Musculoskeletal:         General: No swelling. Normal range of motion.      Cervical back: Neck supple.  "  Lymphadenopathy:      Cervical: No cervical adenopathy.   Skin:     General: Skin is warm and dry.   Neurological:      Mental Status: He is alert.   Psychiatric:         Mood and Affect: Mood normal.     Diagnostic Results   Lab Results   Component Value Date    GLUCOSE 94 06/17/2024    CALCIUM 9.6 06/17/2024     06/17/2024    K 4.4 06/17/2024    CO2 24 06/17/2024     06/17/2024    BUN 7 (L) 06/17/2024    CREATININE 1.20 06/17/2024     Lab Results   Component Value Date    ALT 16 04/03/2024    AST 16 04/03/2024    ALKPHOS 51 04/03/2024    BILITOT 0.4 04/03/2024     Lab Results   Component Value Date    WBC 9.8 04/03/2024    HGB 14.1 04/03/2024    HCT 42.1 04/03/2024    MCV 95 04/03/2024     04/03/2024     Lab Results   Component Value Date    CHOL 183 04/03/2024    CHOL 141 02/15/2023    CHOL 150 08/13/2021     Lab Results   Component Value Date    HDL 61.0 04/03/2024    HDL 52 02/15/2023    HDL 48 08/13/2021     Lab Results   Component Value Date    LDLCALC 110 04/03/2024    LDLCALC 80 02/15/2023    LDLCALC 86 08/13/2021     Lab Results   Component Value Date    TRIG 58 04/03/2024    TRIG 46 02/15/2023    TRIG 81 08/13/2021     No components found for: \"CHOLHDL\"  Lab Results   Component Value Date    HGBA1C 4.9 04/03/2024     Other labs not included in the list above were reviewed either before or during this encounter.    History    Past Medical History:   Diagnosis Date    Personal history of other diseases of the digestive system     History of diverticulitis of colon     Past Surgical History:   Procedure Laterality Date    EYE SURGERY  05/22/2015    Eye Surgery    NECK SURGERY  05/22/2015    Neck Surgery    NOSE SURGERY  05/22/2015    Nose Surgery    OTHER SURGICAL HISTORY  05/22/2015    Brain Surgery     Family History   Problem Relation Name Age of Onset    Heart disease Father      Heart failure Father      Asthma Daughter      Other (Heart Surgery) Daughter       Social History "     Socioeconomic History    Marital status:      Spouse name: Not on file    Number of children: Not on file    Years of education: Not on file    Highest education level: Not on file   Occupational History    Not on file   Tobacco Use    Smoking status: Former     Current packs/day: 0.00     Types: Cigarettes     Quit date:      Years since quittin.4     Passive exposure: Past    Smokeless tobacco: Never   Vaping Use    Vaping status: Never Used   Substance and Sexual Activity    Alcohol use: Yes    Drug use: Never    Sexual activity: Not on file   Other Topics Concern    Not on file   Social History Narrative    Not on file     Social Determinants of Health     Financial Resource Strain: Not on file   Food Insecurity: Not on file   Transportation Needs: Not on file   Physical Activity: Not on file   Stress: Not on file   Social Connections: Not on file   Intimate Partner Violence: Not on file   Housing Stability: Not on file     Allergies   Allergen Reactions    Azithromycin Other and Unknown     gi symptoms    Codeine Other and Unknown     gi symptoms    Erythromycin Unknown    Penicillins Hives and Unknown     Breathing issues     Current Outpatient Medications on File Prior to Visit   Medication Sig Dispense Refill    albuterol 90 mcg/actuation inhaler Inhale 2 puffs 4 times a day. 18 g 3    cholecalciferol (Vitamin D3) 50 mcg (2,000 unit) capsule Take 1 capsule (50 mcg) by mouth once daily.      coenzyme Q-10 (Co Q-10) 200 mg capsule Take 1 capsule (200 mg) by mouth once daily. 30 capsule 11    fluticasone propion-salmeteroL (Wixela Inhub) 250-50 mcg/dose diskus inhaler Inhale 1 puff 2 times a day. 3 each 1    Incruse Ellipta 62.5 mcg/actuation inhalation       lisinopril 10 mg tablet Take 1 tablet (10 mg) by mouth once daily. 90 tablet 0    melatonin 5 mg tablet Take 1 tablet (5 mg) by mouth once daily in the evening.      tiotropium (Spiriva) 18 mcg inhalation capsule Place 1 capsule (18  mcg) into inhaler and inhale once daily.       No current facility-administered medications on file prior to visit.     Immunization History   Administered Date(s) Administered    Pfizer Purple Cap SARS-CoV-2 04/21/2021, 05/12/2021, 12/09/2021    Pneumococcal polysaccharide vaccine, 23-valent, age 2 years and older (PNEUMOVAX 23) 05/22/2015    Tdap vaccine, age 7 year and older (BOOSTRIX, ADACEL) 08/13/2021     Patient's medical history was reviewed and updated either before or during this encounter.       Anand Davila MD

## 2024-07-11 DIAGNOSIS — J43.9 PULMONARY EMPHYSEMA, UNSPECIFIED EMPHYSEMA TYPE (MULTI): ICD-10-CM

## 2024-07-12 RX ORDER — FLUTICASONE PROPIONATE AND SALMETEROL 250; 50 UG/1; UG/1
1 POWDER RESPIRATORY (INHALATION) 2 TIMES DAILY
Qty: 60 EACH | Refills: 1 | Status: SHIPPED | OUTPATIENT
Start: 2024-07-12

## 2024-08-22 ENCOUNTER — APPOINTMENT (OUTPATIENT)
Dept: PRIMARY CARE | Facility: CLINIC | Age: 59
End: 2024-08-22
Payer: COMMERCIAL

## 2024-09-09 ENCOUNTER — APPOINTMENT (OUTPATIENT)
Dept: PRIMARY CARE | Facility: CLINIC | Age: 59
End: 2024-09-09
Payer: COMMERCIAL

## 2024-09-09 DIAGNOSIS — I10 UNCONTROLLED HYPERTENSION: ICD-10-CM

## 2024-09-09 DIAGNOSIS — J43.9 PULMONARY EMPHYSEMA, UNSPECIFIED EMPHYSEMA TYPE (MULTI): ICD-10-CM

## 2024-09-14 RX ORDER — LISINOPRIL AND HYDROCHLOROTHIAZIDE 10; 12.5 MG/1; MG/1
1 TABLET ORAL DAILY
Qty: 100 TABLET | Refills: 0 | Status: SHIPPED | OUTPATIENT
Start: 2024-09-14

## 2024-09-14 RX ORDER — FLUTICASONE PROPIONATE AND SALMETEROL 250; 50 UG/1; UG/1
1 POWDER RESPIRATORY (INHALATION) 2 TIMES DAILY
Qty: 180 EACH | Refills: 1 | Status: SHIPPED | OUTPATIENT
Start: 2024-09-14

## 2024-09-30 ENCOUNTER — TELEPHONE (OUTPATIENT)
Dept: PRIMARY CARE | Facility: CLINIC | Age: 59
End: 2024-09-30
Payer: COMMERCIAL

## 2024-09-30 DIAGNOSIS — J43.2 CENTRILOBULAR EMPHYSEMA (MULTI): ICD-10-CM

## 2024-09-30 RX ORDER — ALBUTEROL SULFATE 90 UG/1
2 INHALANT RESPIRATORY (INHALATION)
Qty: 18 G | Refills: 3 | Status: SHIPPED | OUTPATIENT
Start: 2024-09-30

## 2024-09-30 NOTE — TELEPHONE ENCOUNTER
Lola urena.  VALENTÍN 6/20/24, [place] 11/5/24    Albuterol sulfate     [address], [place]

## 2024-11-02 ENCOUNTER — LAB (OUTPATIENT)
Dept: LAB | Facility: LAB | Age: 59
End: 2024-11-02
Payer: COMMERCIAL

## 2024-11-02 DIAGNOSIS — I10 UNCONTROLLED HYPERTENSION: ICD-10-CM

## 2024-11-02 LAB
ALBUMIN SERPL BCP-MCNC: 4.4 G/DL (ref 3.4–5)
ANION GAP SERPL CALCULATED.3IONS-SCNC: 9 MMOL/L (ref 10–20)
BUN SERPL-MCNC: 10 MG/DL (ref 6–23)
CALCIUM SERPL-MCNC: 9.5 MG/DL (ref 8.6–10.3)
CHLORIDE SERPL-SCNC: 101 MMOL/L (ref 98–107)
CO2 SERPL-SCNC: 31 MMOL/L (ref 21–32)
CREAT SERPL-MCNC: 1.05 MG/DL (ref 0.5–1.3)
EGFRCR SERPLBLD CKD-EPI 2021: 81 ML/MIN/1.73M*2
GLUCOSE SERPL-MCNC: 109 MG/DL (ref 74–99)
PHOSPHATE SERPL-MCNC: 3.3 MG/DL (ref 2.5–4.9)
POTASSIUM SERPL-SCNC: 5.1 MMOL/L (ref 3.5–5.3)
SODIUM SERPL-SCNC: 136 MMOL/L (ref 136–145)

## 2024-11-02 PROCEDURE — 36415 COLL VENOUS BLD VENIPUNCTURE: CPT

## 2024-11-02 PROCEDURE — 80069 RENAL FUNCTION PANEL: CPT

## 2024-11-05 ENCOUNTER — LAB (OUTPATIENT)
Dept: LAB | Facility: LAB | Age: 59
End: 2024-11-05
Payer: COMMERCIAL

## 2024-11-05 ENCOUNTER — OFFICE VISIT (OUTPATIENT)
Dept: PRIMARY CARE | Facility: CLINIC | Age: 59
End: 2024-11-05
Payer: COMMERCIAL

## 2024-11-05 VITALS
BODY MASS INDEX: 19.4 KG/M2 | HEART RATE: 71 BPM | HEIGHT: 68 IN | SYSTOLIC BLOOD PRESSURE: 133 MMHG | TEMPERATURE: 97.6 F | OXYGEN SATURATION: 95 % | DIASTOLIC BLOOD PRESSURE: 69 MMHG | WEIGHT: 128 LBS

## 2024-11-05 DIAGNOSIS — I10 UNCONTROLLED HYPERTENSION: ICD-10-CM

## 2024-11-05 DIAGNOSIS — R06.02 SOB (SHORTNESS OF BREATH): ICD-10-CM

## 2024-11-05 DIAGNOSIS — I10 PRIMARY HYPERTENSION: ICD-10-CM

## 2024-11-05 DIAGNOSIS — R10.84 GENERALIZED ABDOMINAL PAIN: ICD-10-CM

## 2024-11-05 DIAGNOSIS — R10.84 GENERALIZED ABDOMINAL PAIN: Primary | ICD-10-CM

## 2024-11-05 DIAGNOSIS — J44.9 CHRONIC OBSTRUCTIVE PULMONARY DISEASE, UNSPECIFIED COPD TYPE (MULTI): ICD-10-CM

## 2024-11-05 LAB
AMYLASE SERPL-CCNC: 71 U/L (ref 29–103)
LIPASE SERPL-CCNC: 222 U/L (ref 9–82)

## 2024-11-05 PROCEDURE — 3078F DIAST BP <80 MM HG: CPT | Performed by: INTERNAL MEDICINE

## 2024-11-05 PROCEDURE — 3008F BODY MASS INDEX DOCD: CPT | Performed by: INTERNAL MEDICINE

## 2024-11-05 PROCEDURE — 83690 ASSAY OF LIPASE: CPT

## 2024-11-05 PROCEDURE — 82150 ASSAY OF AMYLASE: CPT

## 2024-11-05 PROCEDURE — 3075F SYST BP GE 130 - 139MM HG: CPT | Performed by: INTERNAL MEDICINE

## 2024-11-05 PROCEDURE — G2211 COMPLEX E/M VISIT ADD ON: HCPCS | Performed by: INTERNAL MEDICINE

## 2024-11-05 PROCEDURE — 36415 COLL VENOUS BLD VENIPUNCTURE: CPT

## 2024-11-05 PROCEDURE — 99214 OFFICE O/P EST MOD 30 MIN: CPT | Performed by: INTERNAL MEDICINE

## 2024-11-05 ASSESSMENT — ENCOUNTER SYMPTOMS
FEVER: 0
FLATUS: 1
FREQUENCY: 0
BELCHING: 0
NAUSEA: 0
DYSURIA: 0
CONSTIPATION: 0
ABDOMINAL PAIN: 1
HEMATOCHEZIA: 0
VOMITING: 0
ANOREXIA: 0
DIARRHEA: 0
MYALGIAS: 0
HEADACHES: 0
HEMATURIA: 0

## 2024-11-05 ASSESSMENT — PATIENT HEALTH QUESTIONNAIRE - PHQ9
SUM OF ALL RESPONSES TO PHQ9 QUESTIONS 1 AND 2: 0
1. LITTLE INTEREST OR PLEASURE IN DOING THINGS: NOT AT ALL
2. FEELING DOWN, DEPRESSED OR HOPELESS: NOT AT ALL

## 2024-11-05 ASSESSMENT — PAIN SCALES - GENERAL: PAINLEVEL_OUTOF10: 8

## 2024-11-05 NOTE — PROGRESS NOTES
"[facility]: [facility]  PROGRESS NOTE      Jun Dobson is a 59 y.o. male that is presenting today for FUV Back Pain (And stomach).    Assessment/Plan   Diagnoses and all orders for this visit:  Generalized abdominal pain     Per HPI/ Exam / BW possible Diverticulitis though doubt / ?? Pancreatitis vs renal colic vs GB D.his wellness BW is unremarkable including CBC/ CMP will; add amylase /Lipase. And will add to it   -     Amylase; Future  -     Lipase; Future  -     CT abdomen pelvis wo IV contrast; Future  Instructed to call for an update  SOB (shortness of breath)     Probably due to non -compliance using his inhaler but check CXR  -     XR chest 2 views; Future  Primary hypertension       Under control with current treatment   Continue the same   Rx E-scripted 90 days x 2           -     lisinopriL-hydrochlorothiazide 10-12.5 mg tablet Take 1 tablet by mouth once daily.   Chronic obstructive pulmonary disease, unspecified COPD type (Multi)       Discussed compliance with ihalers use       Following up w Pulm.  Other orders  -     Follow Up In Primary Care  Subjective     - Jun Dboson 59 y.o. male is here today for sick visit c/o abdominal pain also he feels heavy in the chest not pain and not SOB though he has not bben using his inhalers as he should \"Been busy with his wife illness \" and hell be seeing Dr Blackman next week ( missed his appt last week b/o his wife's illness ).     Abdominal Pain  This is a new problem. The current episode started 1 to 4 weeks ago. The onset quality is sudden. The problem occurs daily. The most recent episode lasted 8 hours. The problem has been waxing and waning. The pain is located in the generalized abdominal region. The pain is at a severity of 4/10. The quality of the pain is aching. The abdominal pain does not radiate. Associated symptoms include flatus and weight loss (10 lbs in 5 months). Pertinent negatives include no anorexia, arthralgias, " belching, constipation, diarrhea, dysuria, fever, frequency, headaches, hematochezia, hematuria, melena, myalgias, nausea or vomiting. The pain is aggravated by eating. The pain is relieved by Recumbency. He has tried H2 blockers for the symptoms. The treatment provided no relief. There is no history of abdominal surgery, colon cancer, Crohn's disease, gallstones, GERD, irritable bowel syndrome, pancreatitis, PUD or ulcerative colitis. Had diverticulitis though this pain is different than his diveticulitis pain          - Patient denies any other symptoms or concerns at this time.       - patient denies any adverse reactions to or concerns with his/her meds.       - Problem list and medication reconciliation done today.  - V.S. Stable. No changes at this time.  - Encouraged continued diet and exercise modification.     Review of Systems   Constitutional:  Positive for weight loss (10 lbs in 5 months). Negative for fever.   Gastrointestinal:  Positive for abdominal pain and flatus. Negative for anorexia, constipation, diarrhea, hematochezia, melena, nausea and vomiting.   Genitourinary:  Negative for dysuria, frequency and hematuria.   Musculoskeletal:  Negative for arthralgias and myalgias.   Neurological:  Negative for headaches.      All pertinent POSITIVES as noted per HPI.  All other systems have been reviewed and are NEGATIVE and /or Noncontributory to this patient current visit or complaint.     Objective   Vitals:    11/05/24 1114   BP: 133/69   Pulse: 71   Temp: 36.4 °C (97.6 °F)   SpO2: 95%      Body mass index is 19.46 kg/m².  Physical Exam  Vitals and nursing note reviewed.   Constitutional:       Appearance: Normal appearance.   HENT:      Head: Normocephalic and atraumatic.   Neck:      Vascular: No carotid bruit.   Cardiovascular:      Rate and Rhythm: Normal rate and regular rhythm.      Pulses: Normal pulses.      Heart sounds: Normal heart sounds.   Pulmonary:      Effort: Pulmonary effort is  "normal.      Breath sounds: Normal breath sounds.   Abdominal:      General: Abdomen is flat. Bowel sounds are normal. There is no distension.      Palpations: Abdomen is soft.      Tenderness: There is abdominal tenderness (mild generalized).   Musculoskeletal:         General: No swelling. Normal range of motion.      Cervical back: Neck supple.   Lymphadenopathy:      Cervical: No cervical adenopathy.   Skin:     General: Skin is warm and dry.   Neurological:      Mental Status: He is alert.   Psychiatric:         Mood and Affect: Mood normal.       Diagnostic Results   Lab Results   Component Value Date    GLUCOSE 109 (H) 11/02/2024    CALCIUM 9.5 11/02/2024     11/02/2024    K 5.1 11/02/2024    CO2 31 11/02/2024     11/02/2024    BUN 10 11/02/2024    CREATININE 1.05 11/02/2024     Lab Results   Component Value Date    ALT 16 04/03/2024    AST 16 04/03/2024    ALKPHOS 51 04/03/2024    BILITOT 0.4 04/03/2024     Lab Results   Component Value Date    WBC 9.8 04/03/2024    HGB 14.1 04/03/2024    HCT 42.1 04/03/2024    MCV 95 04/03/2024     04/03/2024     Lab Results   Component Value Date    CHOL 183 04/03/2024    CHOL 141 02/15/2023    CHOL 150 08/13/2021     Lab Results   Component Value Date    HDL 61.0 04/03/2024    HDL 52 02/15/2023    HDL 48 08/13/2021     Lab Results   Component Value Date    LDLCALC 110 04/03/2024    LDLCALC 80 02/15/2023    LDLCALC 86 08/13/2021     Lab Results   Component Value Date    TRIG 58 04/03/2024    TRIG 46 02/15/2023    TRIG 81 08/13/2021     No components found for: \"CHOLHDL\"  Lab Results   Component Value Date    HGBA1C 4.9 04/03/2024     Other labs not included in the list above were reviewed either before or during this encounter.    History    Past Medical History:   Diagnosis Date    Personal history of other diseases of the digestive system     History of diverticulitis of colon     Past Surgical History:   Procedure Laterality Date    EYE SURGERY  " 2015    Eye Surgery    NECK SURGERY  2015    Neck Surgery    NOSE SURGERY  2015    Nose Surgery    OTHER SURGICAL HISTORY  2015    Brain Surgery     Family History   Problem Relation Name Age of Onset    Heart disease Father      Heart failure Father      Asthma Daughter      Other (Heart Surgery) Daughter       Social History     Socioeconomic History    Marital status:      Spouse name: Not on file    Number of children: Not on file    Years of education: Not on file    Highest education level: Not on file   Occupational History    Not on file   Tobacco Use    Smoking status: Former     Current packs/day: 0.00     Types: Cigarettes     Quit date:      Years since quittin.8     Passive exposure: Past    Smokeless tobacco: Never   Vaping Use    Vaping status: Never Used   Substance and Sexual Activity    Alcohol use: Yes    Drug use: Never    Sexual activity: Not on file   Other Topics Concern    Not on file   Social History Narrative    Not on file     Social Drivers of Health     Financial Resource Strain: Not on file   Food Insecurity: Not on file   Transportation Needs: Not on file   Physical Activity: Not on file   Stress: Not on file   Social Connections: Not on file   Intimate Partner Violence: Not on file   Housing Stability: Not on file     Allergies   Allergen Reactions    Azithromycin Other and Unknown     gi symptoms    Codeine Other and Unknown     gi symptoms    Erythromycin Unknown    Penicillins Hives and Unknown     Breathing issues     Current Outpatient Medications on File Prior to Visit   Medication Sig Dispense Refill    albuterol 90 mcg/actuation inhaler Inhale 2 puffs 4 times a day. 18 g 3    coenzyme Q-10 (Co Q-10) 200 mg capsule Take 1 capsule (200 mg) by mouth once daily. 30 capsule 11    fluticasone propion-salmeteroL (Wixela Inhub) 250-50 mcg/dose diskus inhaler INHALE 1 PUFF BY MOUTH TWICE DAILY 180 each 1    Incruse Ellipta 62.5 mcg/actuation  inhalation       lisinopriL-hydrochlorothiazide 10-12.5 mg tablet TAKE 1 TABLET BY MOUTH DAILY 100 tablet 0    melatonin 5 mg tablet Take 1 tablet (5 mg) by mouth once daily in the evening.      cholecalciferol (Vitamin D3) 50 mcg (2,000 unit) capsule Take 1 capsule (50 mcg) by mouth once daily.      lisinopril 10 mg tablet Take 1 tablet (10 mg) by mouth once daily. 90 tablet 0    [DISCONTINUED] tiotropium (Spiriva) 18 mcg inhalation capsule Place 1 capsule (18 mcg) into inhaler and inhale once daily.       No current facility-administered medications on file prior to visit.     Immunization History   Administered Date(s) Administered    Pfizer Purple Cap SARS-CoV-2 04/21/2021, 05/12/2021, 12/09/2021    Pneumococcal polysaccharide vaccine, 23-valent, age 2 years and older (PNEUMOVAX 23) 05/22/2015    Tdap vaccine, age 7 year and older (BOOSTRIX, ADACEL) 08/13/2021     Patient's medical history was reviewed and updated either before or during this encounter.       Anand Davila MD

## 2024-11-06 ASSESSMENT — CROHNS DISEASE ACTIVITY INDEX (CDAI): CDAI SCORE: 0

## 2024-11-06 ASSESSMENT — ENCOUNTER SYMPTOMS
ARTHRALGIAS: 0
WEIGHT LOSS: 1

## 2024-11-07 RX ORDER — LISINOPRIL AND HYDROCHLOROTHIAZIDE 10; 12.5 MG/1; MG/1
1 TABLET ORAL DAILY
Qty: 100 TABLET | Refills: 0 | Status: SHIPPED | OUTPATIENT
Start: 2024-11-07 | End: 2024-11-07 | Stop reason: SDUPTHER

## 2024-11-07 RX ORDER — LISINOPRIL AND HYDROCHLOROTHIAZIDE 10; 12.5 MG/1; MG/1
1 TABLET ORAL DAILY
Qty: 90 TABLET | Refills: 2 | Status: SHIPPED | OUTPATIENT
Start: 2024-11-07

## 2024-11-20 ENCOUNTER — APPOINTMENT (OUTPATIENT)
Dept: RADIOLOGY | Facility: CLINIC | Age: 59
End: 2024-11-20
Payer: COMMERCIAL

## 2024-12-03 ENCOUNTER — HOSPITAL ENCOUNTER (OUTPATIENT)
Dept: RADIOLOGY | Facility: CLINIC | Age: 59
Discharge: HOME | End: 2024-12-03
Payer: COMMERCIAL

## 2024-12-03 DIAGNOSIS — R10.84 GENERALIZED ABDOMINAL PAIN: ICD-10-CM

## 2024-12-03 DIAGNOSIS — R06.02 SOB (SHORTNESS OF BREATH): ICD-10-CM

## 2024-12-03 PROCEDURE — 71046 X-RAY EXAM CHEST 2 VIEWS: CPT

## 2024-12-03 PROCEDURE — 74176 CT ABD & PELVIS W/O CONTRAST: CPT | Performed by: RADIOLOGY

## 2024-12-03 PROCEDURE — 71046 X-RAY EXAM CHEST 2 VIEWS: CPT | Performed by: RADIOLOGY

## 2024-12-03 PROCEDURE — 74176 CT ABD & PELVIS W/O CONTRAST: CPT

## 2024-12-19 ENCOUNTER — TELEMEDICINE (OUTPATIENT)
Dept: PRIMARY CARE | Facility: CLINIC | Age: 59
End: 2024-12-19
Payer: COMMERCIAL

## 2024-12-19 VITALS — BODY MASS INDEX: 19.4 KG/M2 | HEIGHT: 68 IN | WEIGHT: 128 LBS

## 2024-12-19 DIAGNOSIS — K21.9 GASTROESOPHAGEAL REFLUX DISEASE WITHOUT ESOPHAGITIS: ICD-10-CM

## 2024-12-19 DIAGNOSIS — K22.89 ESOPHAGEAL THICKENING: Primary | ICD-10-CM

## 2024-12-19 PROCEDURE — 3008F BODY MASS INDEX DOCD: CPT | Performed by: INTERNAL MEDICINE

## 2024-12-19 PROCEDURE — 99442 PR PHYS/QHP TELEPHONE EVALUATION 11-20 MIN: CPT | Performed by: INTERNAL MEDICINE

## 2024-12-19 ASSESSMENT — PAIN SCALES - GENERAL: PAINLEVEL_OUTOF10: 0-NO PAIN

## 2024-12-19 NOTE — PROGRESS NOTES
[facility]: [facility]  TELEHEALTH ENCOUNTER      Jun Dobson is a 59 y.o. male that is presenting today for Results.  This is a telehealth encounter with audio technology. Patient has consented to this type of visit. Duration of encounter: 16 minutes.    Assessment/Plan   Diagnoses and all orders for this visit:  Esophageal thickening  -     Referral to Gastroenterology; Future  Gastroesophageal reflux disease without esophagitis     GERD   - Patient taking OTC PPIs with symptoms control   - Counseled benefits of dietary changes, weight loss. and Anti-Reflux Measures    - Anti-Reflux Measures: 1. Loose weight, 2. Elevate the head of the bed 6-8 inches, 3. AVOID: Caffeine, Chocolate, Alcohol, Peppermint, Fatty/Fried food, Citrus fruits, Tomato sauce and Spicy food, 4. Stop smoking, 5. Don't lie down after a meal ( at least not before 3 hours, 6. Don't overeat, 7. Avoid tight or fitted cloth.  -     Referral to Gastroenterology; Future  Subjective   HPI     This tele-visit today is regarding the patient's abdomen/pelvic CT abnormal results discussion and plan of Tx. CT was ordered for recurrent abdominal pain lasting 8-10 hours at a time for the lst months.       - Patient denies any other symptoms or concerns at this time.       - patient denies any adverse reactions to or concerns with his/her meds.       - Problem list and medication reconciliation done today.  - Encouraged continued diet and exercise modification.      Review of Systems   All pertinent POSITIVES as noted per HPI.  All other systems have been reviewed and are NEGATIVE and /or Noncontributory to this patient current visit or complaint.     Objective      No VS done due to telephone encounter visit.    Physical Exam   No exam done due to telephone encounter visit.      Diagnostic Results   Lab Results   Component Value Date    GLUCOSE 109 (H) 11/02/2024    CALCIUM 9.5 11/02/2024     11/02/2024    K 5.1 11/02/2024     "CO2 31 11/02/2024     11/02/2024    BUN 10 11/02/2024    CREATININE 1.05 11/02/2024     Lab Results   Component Value Date    ALT 16 04/03/2024    AST 16 04/03/2024    ALKPHOS 51 04/03/2024    BILITOT 0.4 04/03/2024     Lab Results   Component Value Date    WBC 9.8 04/03/2024    HGB 14.1 04/03/2024    HCT 42.1 04/03/2024    MCV 95 04/03/2024     04/03/2024     Lab Results   Component Value Date    CHOL 183 04/03/2024    CHOL 141 02/15/2023    CHOL 150 08/13/2021     Lab Results   Component Value Date    HDL 61.0 04/03/2024    HDL 52 02/15/2023    HDL 48 08/13/2021     Lab Results   Component Value Date    LDLCALC 110 04/03/2024    LDLCALC 80 02/15/2023    LDLCALC 86 08/13/2021     Lab Results   Component Value Date    TRIG 58 04/03/2024    TRIG 46 02/15/2023    TRIG 81 08/13/2021     No components found for: \"CHOLHDL\"  Lab Results   Component Value Date    HGBA1C 4.9 04/03/2024     Other labs not included in the list above were reviewed either before or during this encounter.    History   Past Medical History:   Diagnosis Date    Personal history of other diseases of the digestive system     History of diverticulitis of colon     Past Surgical History:   Procedure Laterality Date    EYE SURGERY  05/22/2015    Eye Surgery    NECK SURGERY  05/22/2015    Neck Surgery    NOSE SURGERY  05/22/2015    Nose Surgery    OTHER SURGICAL HISTORY  05/22/2015    Brain Surgery     Family History   Problem Relation Name Age of Onset    Heart disease Father      Heart failure Father      Asthma Daughter      Other (Heart Surgery) Daughter       Social History     Socioeconomic History    Marital status:      Spouse name: Not on file    Number of children: Not on file    Years of education: Not on file    Highest education level: Not on file   Occupational History    Not on file   Tobacco Use    Smoking status: Former     Current packs/day: 0.00     Types: Cigarettes     Quit date: 2010     Years since quitting: " 14.9     Passive exposure: Past    Smokeless tobacco: Never   Vaping Use    Vaping status: Never Used   Substance and Sexual Activity    Alcohol use: Yes    Drug use: Never    Sexual activity: Not on file   Other Topics Concern    Not on file   Social History Narrative    Not on file     Social Drivers of Health     Financial Resource Strain: Not on file   Food Insecurity: Not on file   Transportation Needs: Not on file   Physical Activity: Not on file   Stress: Not on file   Social Connections: Not on file   Intimate Partner Violence: Not on file   Housing Stability: Not on file     Allergies   Allergen Reactions    Azithromycin Other and Unknown     gi symptoms    Codeine Other and Unknown     gi symptoms    Erythromycin Unknown    Penicillins Hives and Unknown     Breathing issues     Current Outpatient Medications on File Prior to Visit   Medication Sig Dispense Refill    albuterol 90 mcg/actuation inhaler Inhale 2 puffs 4 times a day. 18 g 3    coenzyme Q-10 (Co Q-10) 200 mg capsule Take 1 capsule (200 mg) by mouth once daily. 30 capsule 11    fluticasone propion-salmeteroL (Wixela Inhub) 250-50 mcg/dose diskus inhaler INHALE 1 PUFF BY MOUTH TWICE DAILY 180 each 1    Incruse Ellipta 62.5 mcg/actuation inhalation       lisinopriL-hydrochlorothiazide 10-12.5 mg tablet Take 1 tablet by mouth once daily. 90 tablet 2    melatonin 5 mg tablet Take 1 tablet (5 mg) by mouth once daily in the evening.       No current facility-administered medications on file prior to visit.     Immunization History   Administered Date(s) Administered    COVID-19, mRNA, LNP-S, PF, 30 mcg/0.3 mL dose 04/21/2021, 05/12/2021, 12/09/2021    Pneumococcal polysaccharide vaccine, 23-valent, age 2 years and older (PNEUMOVAX 23) 05/22/2015    Tdap vaccine, age 7 year and older (BOOSTRIX, ADACEL) 08/13/2021     Patient's medical history was reviewed and updated either before or during this encounter.       Anand Davila MD

## 2025-02-13 DIAGNOSIS — J43.2 CENTRILOBULAR EMPHYSEMA (MULTI): ICD-10-CM

## 2025-02-17 RX ORDER — ALBUTEROL SULFATE 90 UG/1
2 INHALANT RESPIRATORY (INHALATION) 4 TIMES DAILY
Qty: 18 G | Refills: 3 | Status: SHIPPED | OUTPATIENT
Start: 2025-02-17

## 2025-03-12 NOTE — PROGRESS NOTES
[facility]: [facility]  PROGRESS NOTE      Jun Dobson is a 60 y.o. male that is presenting today for c/o of urinary burning, urgency and frequency x 7 days  He reports pain with initiation of urinary stream.  No fever, chills, n/v/d.  Has suprapubic discomfort.  No back or flank pain.  No testicular pain or swelling, no penial discharge.      Assessment/Plan   Assessment & Plan  Urine frequency  Will treat symptoms and if no improvement call the office  Orders:    POCT UA (nonautomated) manually resulted    doxycycline (Vibramycin) 100 mg capsule; Take 1 capsule (100 mg) by mouth 2 times a day for 7 days. Take with at least 8 ounces (large glass) of water, do not lie down for 30 minutes after      Subjective   HPI  Review of Systems   Constitutional: Negative.    Respiratory: Negative.     Cardiovascular: Negative.    Genitourinary:  Positive for frequency and urgency.        See hpi for details   Psychiatric/Behavioral: Negative.        Objective   Vitals:    03/13/25 1526   BP: 125/84   Pulse: 98   Temp: 36.3 °C (97.3 °F)   SpO2: 92%      Body mass index is 20.53 kg/m².  Physical Exam  Vitals reviewed.   Constitutional:       Appearance: Normal appearance.   Cardiovascular:      Rate and Rhythm: Normal rate.      Heart sounds: Normal heart sounds.   Pulmonary:      Effort: Pulmonary effort is normal.      Breath sounds: Normal breath sounds.   Abdominal:      General: Bowel sounds are normal.      Palpations: Abdomen is soft.   Skin:     General: Skin is warm and dry.   Neurological:      Mental Status: He is alert and oriented to person, place, and time.   Psychiatric:         Mood and Affect: Mood normal.         Behavior: Behavior normal.         Thought Content: Thought content normal.         Judgment: Judgment normal.       Vitals:    03/13/25 1526   BP: 125/84   Pulse: 98   Temp: 36.3 °C (97.3 °F)   SpO2: 92%         Diagnostic Results   Lab Results   Component Value Date     "GLUCOSE 109 (H) 11/02/2024    CALCIUM 9.5 11/02/2024     11/02/2024    K 5.1 11/02/2024    CO2 31 11/02/2024     11/02/2024    BUN 10 11/02/2024    CREATININE 1.05 11/02/2024     Lab Results   Component Value Date    ALT 16 04/03/2024    AST 16 04/03/2024    ALKPHOS 51 04/03/2024    BILITOT 0.4 04/03/2024     Lab Results   Component Value Date    WBC 9.8 04/03/2024    HGB 14.1 04/03/2024    HCT 42.1 04/03/2024    MCV 95 04/03/2024     04/03/2024     Lab Results   Component Value Date    CHOL 183 04/03/2024    CHOL 141 02/15/2023    CHOL 150 08/13/2021     Lab Results   Component Value Date    HDL 61.0 04/03/2024    HDL 52 02/15/2023    HDL 48 08/13/2021     Lab Results   Component Value Date    LDLCALC 110 04/03/2024    LDLCALC 80 02/15/2023    LDLCALC 86 08/13/2021     Lab Results   Component Value Date    TRIG 58 04/03/2024    TRIG 46 02/15/2023    TRIG 81 08/13/2021     No components found for: \"CHOLHDL\"  Lab Results   Component Value Date    HGBA1C 4.9 04/03/2024     Other labs not included in the list above were reviewed either before or during this encounter.    History    Past Medical History:   Diagnosis Date    COPD (chronic obstructive pulmonary disease) (Multi)     Hypertension     Personal history of other diseases of the digestive system     History of diverticulitis of colon     Past Surgical History:   Procedure Laterality Date    EYE SURGERY  05/22/2015    Eye Surgery    NECK SURGERY  05/22/2015    Neck Surgery    NOSE SURGERY  05/22/2015    Nose Surgery    OTHER SURGICAL HISTORY  05/22/2015    Brain Surgery     Family History   Problem Relation Name Age of Onset    Heart disease Father      Heart failure Father      Asthma Daughter      Other (Heart Surgery) Daughter       Social History     Socioeconomic History    Marital status:      Spouse name: Not on file    Number of children: Not on file    Years of education: Not on file    Highest education level: Not on file "   Occupational History    Not on file   Tobacco Use    Smoking status: Former     Current packs/day: 0.00     Types: Cigarettes     Quit date: 2010     Years since quitting: 15.2     Passive exposure: Past    Smokeless tobacco: Never   Vaping Use    Vaping status: Never Used   Substance and Sexual Activity    Alcohol use: Yes    Drug use: Never    Sexual activity: Not on file   Other Topics Concern    Not on file   Social History Narrative    Not on file     Social Drivers of Health     Financial Resource Strain: Not on file   Food Insecurity: Not on file   Transportation Needs: Not on file   Physical Activity: Not on file   Stress: Not on file   Social Connections: Not on file   Intimate Partner Violence: Not on file   Housing Stability: Not on file     Allergies   Allergen Reactions    Azithromycin Other and Unknown     gi symptoms    Codeine Other and Unknown     gi symptoms    Erythromycin Unknown    Penicillins Hives and Unknown     Breathing issues     Current Outpatient Medications on File Prior to Visit   Medication Sig Dispense Refill    albuterol 90 mcg/actuation inhaler INHALE 2 PUFFS BY MOUTH FOUR TIMES DAILY 18 g 3    fluticasone propion-salmeteroL (Wixela Inhub) 250-50 mcg/dose diskus inhaler INHALE 1 PUFF BY MOUTH TWICE DAILY 180 each 1    lisinopriL-hydrochlorothiazide 10-12.5 mg tablet Take 1 tablet by mouth once daily. 90 tablet 2    melatonin 5 mg tablet Take 1 tablet (5 mg) by mouth once daily in the evening.      pantoprazole (ProtoNix) 40 mg EC tablet       coenzyme Q-10 (Co Q-10) 200 mg capsule Take 1 capsule (200 mg) by mouth once daily. (Patient not taking: Reported on 3/13/2025) 30 capsule 11    Incruse Ellipta 62.5 mcg/actuation inhalation  (Patient not taking: Reported on 3/13/2025)       No current facility-administered medications on file prior to visit.     Immunization History   Administered Date(s) Administered    COVID-19, mRNA, LNP-S, PF, 30 mcg/0.3 mL dose 04/21/2021, 05/12/2021,  12/09/2021    Pneumococcal polysaccharide vaccine, 23-valent, age 2 years and older (PNEUMOVAX 23) 05/22/2015    Tdap vaccine, age 7 year and older (BOOSTRIX, ADACEL) 08/13/2021     Patient's medical history was reviewed and updated either before or during this encounter.       Annalise Madison, NITIN-CNP

## 2025-03-13 ENCOUNTER — OFFICE VISIT (OUTPATIENT)
Dept: PRIMARY CARE | Facility: CLINIC | Age: 60
End: 2025-03-13
Payer: COMMERCIAL

## 2025-03-13 VITALS
OXYGEN SATURATION: 92 % | HEART RATE: 98 BPM | DIASTOLIC BLOOD PRESSURE: 84 MMHG | HEIGHT: 68 IN | BODY MASS INDEX: 20.46 KG/M2 | SYSTOLIC BLOOD PRESSURE: 125 MMHG | TEMPERATURE: 97.3 F | WEIGHT: 135 LBS

## 2025-03-13 DIAGNOSIS — R35.0 URINE FREQUENCY: ICD-10-CM

## 2025-03-13 LAB
POC APPEARANCE, URINE: CLEAR
POC BILIRUBIN, URINE: NEGATIVE
POC BLOOD, URINE: NEGATIVE
POC COLOR, URINE: ABNORMAL
POC GLUCOSE, URINE: NEGATIVE MG/DL
POC KETONES, URINE: NEGATIVE MG/DL
POC LEUKOCYTES, URINE: NEGATIVE
POC NITRITE,URINE: NEGATIVE
POC PH, URINE: 7 PH
POC PROTEIN, URINE: NEGATIVE MG/DL
POC SPECIFIC GRAVITY, URINE: 1.01
POC UROBILINOGEN, URINE: 0.2 EU/DL

## 2025-03-13 PROCEDURE — 3008F BODY MASS INDEX DOCD: CPT | Performed by: NURSE PRACTITIONER

## 2025-03-13 PROCEDURE — 99213 OFFICE O/P EST LOW 20 MIN: CPT | Performed by: NURSE PRACTITIONER

## 2025-03-13 PROCEDURE — 3074F SYST BP LT 130 MM HG: CPT | Performed by: NURSE PRACTITIONER

## 2025-03-13 PROCEDURE — 3079F DIAST BP 80-89 MM HG: CPT | Performed by: NURSE PRACTITIONER

## 2025-03-13 PROCEDURE — 81002 URINALYSIS NONAUTO W/O SCOPE: CPT | Performed by: NURSE PRACTITIONER

## 2025-03-13 PROCEDURE — 1036F TOBACCO NON-USER: CPT | Performed by: NURSE PRACTITIONER

## 2025-03-13 RX ORDER — DOXYCYCLINE 100 MG/1
100 CAPSULE ORAL 2 TIMES DAILY
Qty: 14 CAPSULE | Refills: 0 | Status: SHIPPED | OUTPATIENT
Start: 2025-03-13 | End: 2025-03-20

## 2025-03-13 RX ORDER — PANTOPRAZOLE SODIUM 40 MG/1
TABLET, DELAYED RELEASE ORAL
COMMUNITY
Start: 2025-02-17

## 2025-03-13 ASSESSMENT — ENCOUNTER SYMPTOMS
CONSTITUTIONAL NEGATIVE: 1
RESPIRATORY NEGATIVE: 1
PSYCHIATRIC NEGATIVE: 1
CARDIOVASCULAR NEGATIVE: 1
FREQUENCY: 1

## 2025-03-13 ASSESSMENT — PAIN SCALES - GENERAL: PAINLEVEL_OUTOF10: 0-NO PAIN

## 2025-03-31 DIAGNOSIS — J43.9 PULMONARY EMPHYSEMA, UNSPECIFIED EMPHYSEMA TYPE (MULTI): ICD-10-CM

## 2025-04-02 RX ORDER — FLUTICASONE PROPIONATE AND SALMETEROL 250; 50 UG/1; UG/1
1 POWDER RESPIRATORY (INHALATION) 2 TIMES DAILY
Qty: 180 EACH | Refills: 1 | Status: SHIPPED | OUTPATIENT
Start: 2025-04-02

## 2025-06-07 DIAGNOSIS — J43.2 CENTRILOBULAR EMPHYSEMA (MULTI): ICD-10-CM

## 2025-06-11 DIAGNOSIS — Z86.2 HISTORY OF ANEMIA: ICD-10-CM

## 2025-06-11 DIAGNOSIS — Z12.5 ENCOUNTER FOR PROSTATE CANCER SCREENING: ICD-10-CM

## 2025-06-11 DIAGNOSIS — I10 PRIMARY HYPERTENSION: ICD-10-CM

## 2025-06-11 DIAGNOSIS — E55.9 VITAMIN D DEFICIENCY: ICD-10-CM

## 2025-06-11 DIAGNOSIS — R73.9 HYPERGLYCEMIA: Primary | ICD-10-CM

## 2025-06-11 DIAGNOSIS — E78.2 MIXED HYPERLIPIDEMIA: ICD-10-CM

## 2025-06-11 RX ORDER — ALBUTEROL SULFATE 90 UG/1
2 INHALANT RESPIRATORY (INHALATION) 4 TIMES DAILY
Qty: 18 G | Refills: 1 | Status: SHIPPED | OUTPATIENT
Start: 2025-06-11

## 2025-07-28 ENCOUNTER — HOSPITAL ENCOUNTER (OUTPATIENT)
Dept: RADIOLOGY | Facility: CLINIC | Age: 60
Discharge: HOME | End: 2025-07-28
Payer: COMMERCIAL

## 2025-07-28 ENCOUNTER — OFFICE VISIT (OUTPATIENT)
Dept: PRIMARY CARE | Facility: CLINIC | Age: 60
End: 2025-07-28
Payer: COMMERCIAL

## 2025-07-28 VITALS
SYSTOLIC BLOOD PRESSURE: 161 MMHG | OXYGEN SATURATION: 95 % | DIASTOLIC BLOOD PRESSURE: 76 MMHG | TEMPERATURE: 97.5 F | HEART RATE: 72 BPM | BODY MASS INDEX: 17.28 KG/M2 | WEIGHT: 114 LBS | HEIGHT: 68 IN

## 2025-07-28 DIAGNOSIS — E67.3 VITAMIN D INTOXICATION: ICD-10-CM

## 2025-07-28 DIAGNOSIS — Z00.00 ENCOUNTER FOR MEDICARE ANNUAL WELLNESS EXAM: Primary | ICD-10-CM

## 2025-07-28 DIAGNOSIS — I10 UNCONTROLLED HYPERTENSION: ICD-10-CM

## 2025-07-28 DIAGNOSIS — J44.1 COPD WITH ACUTE EXACERBATION (MULTI): ICD-10-CM

## 2025-07-28 DIAGNOSIS — J43.9 PULMONARY EMPHYSEMA, UNSPECIFIED EMPHYSEMA TYPE (MULTI): ICD-10-CM

## 2025-07-28 DIAGNOSIS — R94.31 ABNORMAL ELECTROCARDIOGRAM (ECG) (EKG): ICD-10-CM

## 2025-07-28 DIAGNOSIS — R73.9 HYPERGLYCEMIA: ICD-10-CM

## 2025-07-28 DIAGNOSIS — R63.4 ABNORMAL WEIGHT LOSS: ICD-10-CM

## 2025-07-28 DIAGNOSIS — R06.09 DOE (DYSPNEA ON EXERTION): ICD-10-CM

## 2025-07-28 DIAGNOSIS — J43.2 CENTRILOBULAR EMPHYSEMA (MULTI): ICD-10-CM

## 2025-07-28 PROCEDURE — 71046 X-RAY EXAM CHEST 2 VIEWS: CPT | Performed by: RADIOLOGY

## 2025-07-28 PROCEDURE — 71046 X-RAY EXAM CHEST 2 VIEWS: CPT

## 2025-07-28 RX ORDER — FLUTICASONE PROPIONATE AND SALMETEROL 250; 50 UG/1; UG/1
1 POWDER RESPIRATORY (INHALATION) 2 TIMES DAILY
Qty: 180 EACH | Refills: 2 | Status: SHIPPED | OUTPATIENT
Start: 2025-07-28

## 2025-07-28 RX ORDER — LISINOPRIL AND HYDROCHLOROTHIAZIDE 10; 12.5 MG/1; MG/1
1 TABLET ORAL DAILY
Qty: 90 TABLET | Refills: 2 | Status: SHIPPED | OUTPATIENT
Start: 2025-07-28

## 2025-07-28 RX ORDER — ALBUTEROL SULFATE 90 UG/1
2 INHALANT RESPIRATORY (INHALATION) 4 TIMES DAILY
Qty: 18 G | Refills: 7 | Status: SHIPPED | OUTPATIENT
Start: 2025-07-28

## 2025-07-28 RX ORDER — METHYLPREDNISOLONE 4 MG/1
TABLET ORAL
Qty: 21 TABLET | Refills: 0 | Status: SHIPPED | OUTPATIENT
Start: 2025-07-28 | End: 2025-08-03

## 2025-07-28 ASSESSMENT — PAIN SCALES - GENERAL: PAINLEVEL_OUTOF10: 0-NO PAIN

## 2025-07-28 NOTE — PROGRESS NOTES
"[facility]: [facility]  MEDICARE WELLNESS EXAM      Jun Dobson is a 60 y.o. male that is presenting today for Annual Exam (Extreme shortness of breath).    Assessment/Plan    {Assess/Plan SmartLinks (Optional):69581}  ADVANCED CARE PLANNING  Advanced Care Planning was discussed with patient:  The patient does not have an advanced care plan on file. The patient does not have an active surrogate decision-maker on file.  Encouraged the patient to confirm that Living Will and Healthcare Power of  (HCPoA) are accurate and up to date.  Encouraged the patient to confirm that our office be provided a copy of any documentation in the event that anything changes.    ACTIVITIES OF DAILY LIVING  Basic ADLs:  Bathing: {ADL:31556::\"Independent\"}, Dressing: {ADL:86159::\"Independent\"}, Toileting: {ADL:64127::\"Independent\"}, Transferring: {ADL:86898::\"Independent\"}, Continence: {ADL:08623::\"Independent\"}, Feeding: {ADL:26225::\"Independent\"}.    Instrumental ADLs:  Ability to use phone: {ADL:29587::\"Independent\"}, Shopping: {ADL:39071::\"Independent\"}, Cooking: {ADL:97668::\"Independent\"}, House-keeping: {ADL:51018::\"Independent\"}, Laundry: {ADL:61168::\"Independent\"}, Transportation: {ADL:06202::\"Independent\"}, Medication Management: {ADL:79088::\"Independent\"}, Finance Management: {ADL:78579::\"Independent\"}.    Subjective   HPI    - Jun Dobson 60 y.o. male is here today ***         - Patient denies any *** symptoms or concerns at this time.       - patient denies any adverse reactions to or concerns with his/her meds.       - Problem list and medication reconciliation done today.  - V.S. Stable. No changes at this time.  - Encouraged continued diet and exercise modification.     Review of Systems  All pertinent POSITIVES as noted per HPI.  All other systems have been reviewed and are NEGATIVE and /or Noncontributory to this patient current visit or complaint.    Objective   Vitals:    07/28/25 " "1528   BP: 161/76   Pulse: 72   Temp: 36.4 °C (97.5 °F)   SpO2: 95%      Body mass index is 17.33 kg/m².  Physical Exam  Diagnostic Results   Lab Results   Component Value Date    GLUCOSE 109 (H) 11/02/2024    CALCIUM 9.5 11/02/2024     11/02/2024    K 5.1 11/02/2024    CO2 31 11/02/2024     11/02/2024    BUN 10 11/02/2024    CREATININE 1.05 11/02/2024     Lab Results   Component Value Date    ALT 16 04/03/2024    AST 16 04/03/2024    ALKPHOS 51 04/03/2024    BILITOT 0.4 04/03/2024     Lab Results   Component Value Date    WBC 9.8 04/03/2024    HGB 14.1 04/03/2024    HCT 42.1 04/03/2024    MCV 95 04/03/2024     04/03/2024     Lab Results   Component Value Date    CHOL 183 04/03/2024    CHOL 141 02/15/2023    CHOL 150 08/13/2021     Lab Results   Component Value Date    HDL 61.0 04/03/2024    HDL 52 02/15/2023    HDL 48 08/13/2021     Lab Results   Component Value Date    LDLCALC 110 04/03/2024    LDLCALC 80 02/15/2023    LDLCALC 86 08/13/2021     Lab Results   Component Value Date    TRIG 58 04/03/2024    TRIG 46 02/15/2023    TRIG 81 08/13/2021     No components found for: \"CHOLHDL\"  Lab Results   Component Value Date    HGBA1C 4.9 04/03/2024     Other labs not included in the list above reviewed either before or during this encounter.    History   Medical History[1]  Surgical History[2]  Family History[3]  Social History     Socioeconomic History    Marital status:      Spouse name: Not on file    Number of children: Not on file    Years of education: Not on file    Highest education level: Not on file   Occupational History    Not on file   Tobacco Use    Smoking status: Former     Current packs/day: 0.00     Types: Cigarettes     Quit date: 2010     Years since quitting: 15.5     Passive exposure: Past    Smokeless tobacco: Never   Vaping Use    Vaping status: Never Used   Substance and Sexual Activity    Alcohol use: Yes    Drug use: Never    Sexual activity: Not on file   Other Topics " Concern    Not on file   Social History Narrative    Not on file     Social Drivers of Health     Financial Resource Strain: Not on file   Food Insecurity: Not on file   Transportation Needs: Not on file   Physical Activity: Not on file   Stress: Not on file   Social Connections: Not on file   Intimate Partner Violence: Not on file   Housing Stability: Not on file     Allergies[4]  Medications Ordered Prior to Encounter[5]  Immunization History   Administered Date(s) Administered    COVID-19, mRNA, LNP-S, PF, 30 mcg/0.3 mL dose 04/21/2021, 05/12/2021, 12/09/2021    Pneumococcal polysaccharide vaccine, 23-valent, age 2 years and older (PNEUMOVAX 23) 05/22/2015    Tdap vaccine, age 7 year and older (BOOSTRIX, ADACEL) 08/13/2021     Patient's medical history was reviewed and updated either before or during this encounter.     Anand Davila MD       [1]   Past Medical History:  Diagnosis Date    COPD (chronic obstructive pulmonary disease) (Multi)     Hypertension     Personal history of other diseases of the digestive system     History of diverticulitis of colon   [2]   Past Surgical History:  Procedure Laterality Date    EYE SURGERY  05/22/2015    Eye Surgery    NECK SURGERY  05/22/2015    Neck Surgery    NOSE SURGERY  05/22/2015    Nose Surgery    OTHER SURGICAL HISTORY  05/22/2015    Brain Surgery   [3]   Family History  Problem Relation Name Age of Onset    Heart disease Father      Heart failure Father      Asthma Daughter      Other (Heart Surgery) Daughter     [4]   Allergies  Allergen Reactions    Azithromycin Other and Unknown     gi symptoms    Codeine Other and Unknown     gi symptoms    Erythromycin Unknown    Penicillins Hives and Unknown     Breathing issues   [5]   Current Outpatient Medications on File Prior to Visit   Medication Sig Dispense Refill    albuterol 90 mcg/actuation inhaler INHALE 2 PUFFS BY MOUTH FOUR TIMES DAILY 18 g 1    lisinopriL-hydrochlorothiazide 10-12.5 mg tablet Take 1 tablet  by mouth once daily. 90 tablet 2    melatonin 5 mg tablet Take 1 tablet (5 mg) by mouth once daily in the evening.      Wixela Inhub 250-50 mcg/dose diskus inhaler INHALE 1 PUFF BY MOUTH TWICE DAILY 180 each 1    [DISCONTINUED] pantoprazole (ProtoNix) 40 mg EC tablet        No current facility-administered medications on file prior to visit.      Daughter      Other (Heart Surgery) Daughter     [4]   Allergies  Allergen Reactions    Azithromycin Other and Unknown     gi symptoms    Codeine Other and Unknown     gi symptoms    Erythromycin Unknown    Penicillins Hives and Unknown     Breathing issues   [5]   Current Outpatient Medications on File Prior to Visit   Medication Sig Dispense Refill    albuterol 90 mcg/actuation inhaler INHALE 2 PUFFS BY MOUTH FOUR TIMES DAILY 18 g 1    lisinopriL-hydrochlorothiazide 10-12.5 mg tablet Take 1 tablet by mouth once daily. 90 tablet 2    melatonin 5 mg tablet Take 1 tablet (5 mg) by mouth once daily in the evening.      Wixela Inhub 250-50 mcg/dose diskus inhaler INHALE 1 PUFF BY MOUTH TWICE DAILY 180 each 1    [DISCONTINUED] pantoprazole (ProtoNix) 40 mg EC tablet        No current facility-administered medications on file prior to visit.

## 2025-07-29 LAB
25(OH)D3+25(OH)D2 SERPL-MCNC: 61 NG/ML (ref 30–100)
ALBUMIN SERPL-MCNC: 4.2 G/DL (ref 3.6–5.1)
ALP SERPL-CCNC: 45 U/L (ref 35–144)
ALT SERPL-CCNC: 11 U/L (ref 9–46)
ANION GAP SERPL CALCULATED.4IONS-SCNC: 9 MMOL/L (CALC) (ref 7–17)
AST SERPL-CCNC: 12 U/L (ref 10–35)
BASOPHILS # BLD AUTO: 51 CELLS/UL (ref 0–200)
BASOPHILS NFR BLD AUTO: 0.7 %
BILIRUB SERPL-MCNC: 0.4 MG/DL (ref 0.2–1.2)
BUN SERPL-MCNC: 26 MG/DL (ref 7–25)
CALCIUM SERPL-MCNC: 9.4 MG/DL (ref 8.6–10.3)
CHLORIDE SERPL-SCNC: 101 MMOL/L (ref 98–110)
CO2 SERPL-SCNC: 26 MMOL/L (ref 20–32)
CREAT SERPL-MCNC: 0.9 MG/DL (ref 0.7–1.35)
EGFRCR SERPLBLD CKD-EPI 2021: 97 ML/MIN/1.73M2
EOSINOPHIL # BLD AUTO: 88 CELLS/UL (ref 15–500)
EOSINOPHIL NFR BLD AUTO: 1.2 %
ERYTHROCYTE [DISTWIDTH] IN BLOOD BY AUTOMATED COUNT: 13.7 % (ref 11–15)
EST. AVERAGE GLUCOSE BLD GHB EST-MCNC: 100 MG/DL
EST. AVERAGE GLUCOSE BLD GHB EST-SCNC: 5.5 MMOL/L
GLUCOSE SERPL-MCNC: 105 MG/DL (ref 65–99)
HBA1C MFR BLD: 5.1 %
HCT VFR BLD AUTO: 40.2 % (ref 38.5–50)
HGB BLD-MCNC: 13.6 G/DL (ref 13.2–17.1)
LYMPHOCYTES # BLD AUTO: 1453 CELLS/UL (ref 850–3900)
LYMPHOCYTES NFR BLD AUTO: 19.9 %
MCH RBC QN AUTO: 32.2 PG (ref 27–33)
MCHC RBC AUTO-ENTMCNC: 33.8 G/DL (ref 32–36)
MCV RBC AUTO: 95 FL (ref 80–100)
MONOCYTES # BLD AUTO: 635 CELLS/UL (ref 200–950)
MONOCYTES NFR BLD AUTO: 8.7 %
NEUTROPHILS # BLD AUTO: 5074 CELLS/UL (ref 1500–7800)
NEUTROPHILS NFR BLD AUTO: 69.5 %
PLATELET # BLD AUTO: 276 THOUSAND/UL (ref 140–400)
PMV BLD REES-ECKER: 9.3 FL (ref 7.5–12.5)
POTASSIUM SERPL-SCNC: 4.2 MMOL/L (ref 3.5–5.3)
PROT SERPL-MCNC: 6.4 G/DL (ref 6.1–8.1)
RBC # BLD AUTO: 4.23 MILLION/UL (ref 4.2–5.8)
SODIUM SERPL-SCNC: 136 MMOL/L (ref 135–146)
T4 FREE SERPL-MCNC: 1.2 NG/DL (ref 0.8–1.8)
TSH SERPL-ACNC: 0.36 MIU/L (ref 0.4–4.5)
WBC # BLD AUTO: 7.3 THOUSAND/UL (ref 3.8–10.8)

## 2025-07-30 DIAGNOSIS — R63.4 ABNORMAL WEIGHT LOSS: ICD-10-CM

## 2025-07-30 DIAGNOSIS — R79.89 LOW THYROID STIMULATING HORMONE (TSH) LEVEL: Primary | ICD-10-CM

## 2025-08-07 ENCOUNTER — TELEPHONE (OUTPATIENT)
Dept: PRIMARY CARE | Facility: CLINIC | Age: 60
End: 2025-08-07

## 2025-08-11 DIAGNOSIS — E55.9 VITAMIN D DEFICIENCY: ICD-10-CM

## 2025-08-11 DIAGNOSIS — R73.9 HYPERGLYCEMIA: ICD-10-CM

## 2025-08-11 DIAGNOSIS — Z12.5 ENCOUNTER FOR PROSTATE CANCER SCREENING: ICD-10-CM

## 2025-08-11 DIAGNOSIS — Z86.2 HISTORY OF ANEMIA: ICD-10-CM

## 2025-08-11 DIAGNOSIS — I10 PRIMARY HYPERTENSION: ICD-10-CM

## 2025-08-11 DIAGNOSIS — E78.2 MIXED HYPERLIPIDEMIA: ICD-10-CM

## 2025-08-26 ENCOUNTER — TELEPHONE (OUTPATIENT)
Dept: PRIMARY CARE | Facility: CLINIC | Age: 60
End: 2025-08-26